# Patient Record
(demographics unavailable — no encounter records)

---

## 2021-07-26 NOTE — NUR
Patient in room 4a laying on gurny c/o right hip pain after mechanical fall. 
Patient is A/Ox3 but mostly farsi speaking. Patient denies head trauma. 
Swelling noted on right hip. Provide comfort and safety measures.

## 2021-07-27 NOTE — NUR
relief afforded with morphine 2 mg iv given.requested to let her rest and sleep, getting 
irritable during assessment and interview process, kept npo .

## 2021-07-27 NOTE — NUR
admitted this 83 y.o. lady from er via [facility],alert,orientedx4, able to make needs known, no 
acute distress noted.admission care done, iv site on left ac patent and no signs of 
infiltration .repositioned for comfort.oral care rendered.skin clear.

## 2021-07-28 NOTE — NUR
Patient awake in bed. On 1L O2 via NC. No signs of acute distress. IV access on left AC #20 
heplock. Call light within reach. Bed alarm on. Will continue to monitor.

## 2021-07-29 NOTE — NUR
LAB AND BLOOD BANK OFFERED TO DRAW TYPE AND SCREEN AGAIN BUT PATIENT REFUSED. SHE AGREED TO 
ALLOW THE DRAW IN AM. TYPE AND SCREEN RESCHEDULE FOR AM.

## 2021-07-29 NOTE — NUR
End of shift note



Patient refuse blood transfusion today. MD Delaney and family agree to repeat CBC tomorrow and 
do transfusion. Patient refuse medication in the morning. Able to take replacement magnesium 
800mg tablet for Mg 1.7. will continue monitor

## 2021-07-29 NOTE — NUR
RECEIVED PATIENT IN BED ALERT AND AWAKE WITH 1 L/M OF O2 VIA NC. CALL LIGHT WITHIN REACH. 
WILL CONTINUE TO MONITOR.

## 2021-07-29 NOTE — NUR
Shift End Report: VS stable. No s/s of respiratory distress. Tolerated O2 @ 1L/min. No 
complaint presented all night. Continue current plan of care. Slept well.

## 2021-07-30 NOTE — NUR
1 UNIT OF BLOOD STARTED. INFUSING TO RIGHT FA #22 GAUGE. VS WNL. AFEBRILE. CALL LIGHT IN 
REACH. ALL NEEDS ATTENDED. WILL CONTINUE TO MONITOR AND ASSESS.

## 2021-07-30 NOTE — NUR
RECEIVED PATIENT AWAKE IN BED. A/O X3. FARSI SPEAKING WITH MINIMAL ENGLISH, BUT ABLE TO MAKE 
NEEDS KNOWN. PATIENT VERBALIZED SHE IS AWARE OF BLOOD TRANSFUSION. CONSENT SIGNED IN CHART. 
VS WNL. PATIENT C/O PAIN IN RIGHT HIP, RADIATING TO LEFT HIP. PATIENT GIVEN NORCO 1 TAB PO 
PRN FOR PAIN. ICE APPLIED FOR ADDITIONAL PAIN DISCOMFORT. DRESSING NOTED TO RIGHT HIP, 
C/D/I. CALL LIGHT IN REACH. ALL NEEDS ATTENDED. WILL CONTINUE TO MONITOR AND ASSESS.

## 2021-07-30 NOTE — NUR
BLOOD TRANSFUSION COMPLETED. VS WNL. NO RESP. DISTRESS NOTED. INFUSED WELL. NO REACTION 
NOTED. WILL CONTINUE TO MONITOR AND ASSESS.

## 2021-07-30 NOTE — NUR
NO TRANSFUSION REACTION NOTED. INFUSING WELL. PATIENT AWAKE IN BED, WATCHING TV. NO SOB 
NOTED OR C/O. WILL CONTINUE TO MONITOR AND ASSESS. CALL LIGHT IN REACH.

## 2021-07-31 NOTE — NUR
PATIENT AWAKE IN BED. C/O ITCHINESS. PATIENT GIVEN BENADRYL 25MG PO PRN. VS WNL. H/L INTACT 
AND PATENT, NOTED TO RIGHT FA #22 GAUGE. PATIENT REPOSITIONED FOR COMFORT AND PRESSURE 
RELIEF. C/O PAIN WHEN MOVED, BUT DENIES ANY NEED FOR PAIN MEDICATION AT THIS TIME. NO RESP. 
DISTRESS NOTED. BED ALARM ON. CALL LIGHT IN REACH. ALL NEEDS ATTENDED. WILL CONTINUE TO 
MONITOR AND ASSESS.

## 2021-07-31 NOTE — NUR
patient in bed awake and alert, HOB elevated. no complains of any SOB, pain or discomfort at 
this time.

parsons catheter draining well. IV flushed, patent and intact. call light and belongings 
within reach. will report to oncoming shift.

## 2021-07-31 NOTE — NUR
PATIENT IN BED SLEEPING IN STABLE CONDITION. NO S/S OF ANY SOB, PAIN OR DISCOMFORT NOTED

AT THIS TIME, RIVERA CATHETER DRAINING WELL. CALL LIGHT AND BELONGINGS WITHIN REACH WILL 
CONTINUE TO MONITOR.

## 2021-08-01 NOTE — NUR
dc orders received noted and carried out,dc instruction and rn report given to the nursing 
home .pt family notified about the dc ,dc heplock per md orders.pt left the facility via 
ambulances in stable condition

## 2021-12-24 NOTE — NUR
RECEIVED PATIENT VIA GURNEY FROM ER. PATIENT IS A/O X3. FARSI SPEAKING BUT ABLE TO MAKE 
NEEDS KNOWN AND SPEAKS MINIMAL ENGLISH. VS WNL. C/O GENERALIZED DISCOMFORT FROM FALL AT HOME 
EARLIER. NO SOB NOTED. H/L INTACT AND PATENT, NOTED TO LEFT AC #20 GAUGE. ORIENTED PATIENT 
TO ROOM AND CALL LIGHT. CALL LIGHT IN REACH. ALL NEEDS ATTENDED. WILL CONTINUE TO MONITOR 
AND ASSESS.

## 2021-12-24 NOTE — NUR
Patient resting in bed. AOx3-4. On room air. No signs of acute distress. Patient complained 
of back pain, Norco PO PRN given and patient expressed relief. Patient refused insertion of 
IV despite explanation of the need of the IV access and risks of refusal and patient still 
refused. Needs anticipated and met. Bed alarm on. Call light within reach. Will endorse to 
incoming shift for continuity of care.

## 2021-12-24 NOTE — NUR
Received patient in bed. AOx3. On RA. No signs of acute distress. Patient denies pain/ 
discomfort. Bed alarm on. Call light within reach. Will continue to monitor.

## 2021-12-24 NOTE — NUR
PATIENT AWAKE IN BED, ASSISTED TO BATHROOM. PATIENT REFUSING FOR IV HEPLOCK. PATIENT 
INFORMED ON IMPORTANCE OF HAVING HEPLOCK, PATIENT STILL REFUSED. RN CHARGE NOTIFIED. WILL 
CONTINUE TO MONITOR AND ASSESS.

## 2021-12-24 NOTE — NUR
Patient is sitting up on edge of bed. She is Farsi speaking. Able to make simple needs 
known, can speak minimal English. C/o pain 8/10 to right leg and constipation. States she 
has not had a BM for 4 days. On RA, No SOB. Patient assisted to the bathroom. Able to 
ambulate using FWW with one person assist. Safety measures initiated. Call light within 
reach.

## 2021-12-25 NOTE — NUR
Pt is a/o x 3, Farsi speaking. Pt complains of right sided leg pain, provided PRN 
medication. Pt has unsteady gait, need assist from bed to wheelchair to use the restroom; 
had 1x BM today. Pt refused morning labs. Pt has a visitor at bedside, call light within 
reach, comfort measures provided, will continue to monitor.

## 2021-12-25 NOTE — NUR
Received patient lying in bed. AAOX4, farsi speaking. Patient denies acute distress. Able to 
ambulate to the bathroom, however, requires assistance. Safety precautions and comfort 
measures initiated. Bed in locked, call light button and frequently used items within reach. 
Will continue to monitor.

## 2021-12-25 NOTE — NUR
Slept intermittently this shift. Continues to have pain to right leg, 8/10 [place] PO provided 
as ordered. Patient expresses pain relief post Norco administration. Able to have one BM 
this shift, states she still feels "full". Noted with anxiety during night, asking staff to 
sleep in her room with her not wanting to be alone. Emotional support and active listening 
provided and effective. On RA, no SOB. On telemetry, NSR. Safety measures continued, call 
light within reach.

## 2021-12-26 NOTE — NUR
Patient slept through the night. All due medications were given as ordered and tolerated 
well. Patient complained of having a mild headache and pain of 8-10 on her lower 
extremities, PRN medications were given and tolerated well.  No acute distress noted at this 
time. Needs were addressed and met. Safety precautions and comfort measures maintained. Will 
endorse to day shift.

## 2021-12-26 NOTE — NUR
Pt is in wheelchair at this time. [id number] of pain or acute distress. Comfort 
measures provided. Pt plan is to discharge, pending approval for rehab unit or placement in 
SNF. pt is compliant with medications and cooperative with care. vitals within normal 
limits, saturating 98% on room air, no temperature. Call light within reach. Will continue 
to monitor pt.

## 2021-12-27 NOTE — NUR
Received to care, initially up in wheel chair,pleasant, requesting her eye drops, and pain 
medication for her back pain. This writer came to her room, at 2100, with her eye drops, and 
PRN Norco, for pain. When she found out the Norco was not due again until 2117, she became 
angry, yelling, and stating, get out of here. I dont want anything. She was encouraged to 
take her eye drops first, and that by the time she was done, it would be time again for the 
Norco. This writer spent several minutes trying to convince her. So long, in fact, that her 
Norco was already due, but she still refused everything. This writer saved the Norco for 
several hours, but she still did not want it, so it was returned to the pharmacy. She ended 
up sleeping fairly well. and continues to sleep, now. No distress, noted.

## 2021-12-27 NOTE — NUR
Fall precaution implemented.  Pt has good appetite for breakfast.  Pt sitting in chair.  
Call light is within reach.

## 2021-12-27 NOTE — NUR
Pt continues to refuse blood draw.  Explained to patient purpose of the test that the dr 
ordered but pt continues to refuse blood draw.  PT tolerated physical therapy.  Pt on 
wheelchair denies any c/o pain.

## 2021-12-27 NOTE — NUR
Spoke with YONAS Ruvalcaba.  Discharge instructions given.  Pt will be transferred to ARU here 
at [facility].  Skin dry and intact.

## 2021-12-28 NOTE — NUR
pt was admitted last night, in the service of ARU, from [facility], rm [id number]. Fernando Blum, and 
YARI Marie were both notified of the admission. Pt was received lying in bed, calm 
and cooperative, and slept well, throughout the night. No distress noted.

## 2021-12-29 NOTE — NUR
PT seen and visited by DR Blum with new orders for Norco 10 /325 BID at 0800 and 1300 
schedule to help for pain before PT ,Celebrex 200 mg daily at 0900 and Lyrica 25 mg Q8  
order noted and carry out.

## 2021-12-29 NOTE — NUR
PATIENT STATED SHE HAS A LOT OF PAIN SO I GAVE HER THE OPTION OF ONE OR TWO NORCOS AND SHE 
WANTED TWO GIVEN AS ORDERED MADE COMFORTABLE WILL CONTINUE TO OBSERVE.

## 2021-12-29 NOTE — NUR
Received AAOx3 resting in bed in bed,. Assisted to the bathroom as needed. PRN Norco was 
given as order, for right hip pain , with good results. Up in W/C ambulating with PT assist 
by FWW. No acute distress noted, all schedule meds given as order

## 2021-12-30 NOTE — NUR
Patient is on Norco during the shift as routine. No distress identified. Kept call light 
within reach. All due meds given. All needs attended. Safety measures maintained. Aspiration 
precaution maintained. Will endorse for continuity of care.

## 2021-12-31 NOTE — NUR
Nursing- OOB to chair to the bathroom, continent of her bladder. Complained of lower back 
pain. as well as right upper arm pain, claimed r/t to previous fall, safety reviewed and 
emphasized.

## 2021-12-31 NOTE — NUR
Nursing- Dr Rhett BARRON, in to see patient , was informed of her itching on her hands, orders 
received.

## 2021-12-31 NOTE — NUR
No distress noted. Kept call light within reach. Frequent visual check. Safety precaution 
maintained. All due meds given. All needs attended. Will endorse to the next shift for 
continuity of care.

## 2022-01-01 NOTE — NUR
Received resident awake on bed with no respiratory distress. She is alert and oriented x4, 
able to make needs known, BRP using walker with assistance. Cozaar PO not given d/t pt 
refusal and /48. Tylenol PRN given for left shoulder pain. All needs attended. Call 
light placed within reach. Will endorse to next shift.

## 2022-01-01 NOTE — NUR
Received resident awake on bed with no respiratory distress upon initial rounds. Slept 
throughout the night, easily arousable for care. She is alert and oriented x4, able to make 
needs known, BRP using walker with assistance. Due meds given on time and tolerated well. 
All needs attended. Call light placed within reach. Frequent visual checks done. Will 
endorse to next shift.

## 2022-01-01 NOTE — NUR
Patient is alert and oriented , pleasant and cooperative upon assessment. Patient in room 
air saturating at 95%. All due meds given per MD order. Patient has no signs of distress. 
Kept skin clean and dry. Placed call light within reach.

## 2022-01-01 NOTE — NUR
Received resident awake on bed with no respiratory distress upon initial rounds. Slept 
throughout the night, easily arousable for care. She is alert and oriented x4, able to make 
needs known, BRP using walker with assistance. Due meds given on time and tolerated well. 
All needs attended. Call light placed within reach. Frequent visual checks done. Will 
endorse to next shift. 

-------------------------------------------------------------------------------

Addendum: 01/01/22 at 2117 by ABRAM SALAZAR RN

-------------------------------------------------------------------------------

Strike out charting, wrong documentation.

## 2022-01-02 NOTE — NUR
Received pt sitting up in wheelchair, assisted pt back to bed. Alert and oriented to name, 
place, time and situation. Able to state all needs. On room air saturating at 99%. No signs 
of acute distress. Belongings and call lights within reach. Safety measures initiated.

## 2022-01-03 NOTE — NUR
Pt slept throughout the night. Alert and oriented to name, place, time and situation. On 
room air saturating at 98%. No signs of acute distress. Tylenol 650 mg PO PRN given for back 
pain. Able to make needs known. Needs have been attended and met. Belongings and call lights 
within reach. Safety measures maintained.

## 2022-01-04 NOTE — NUR
Patient in bed, alert and oriented x 4, cooperative upon assessment, in room air saturating 
at 95%. All needs met promptly. Assisted to the bathroom and placed back to bed with bed 
alarm functioning well. Kept skin clean and dry. Call light within easy reach.

## 2022-01-04 NOTE — NUR
per son, the patient is taking Moxifloxacin 0.5% on her right eye every 4 hours 4x a day. 
Notified Dr. Ron and awaiting for response and endorsed tot he next shift.

## 2022-01-05 NOTE — NUR
Received patient asleep in bed. On room air. No signs of acute distress. Bed alarm on. Call 
light within reach. Will continue to monitor.

## 2022-01-05 NOTE — NUR
Patient resting in bed. AOx3-4. On room air. No signs of acute distress. Patient denies 
pain/ discomfort at this time. Compliant with medications and care. Participated with 
physical and occupational therapy. Bed alarm on. Call light within reach. Will endorse to 
incoming shift for continuity of care.

## 2022-01-06 NOTE — NUR
Patient's son Andrew at bedside and mentioned regarding antibiotic eyedrops 1 drop 4x a day 
on the right eye that needs to be continued together with Predforte eyedrops as advised by 
patient's ophthalmologist s/p corneal transplant 2 months ago. Sent a message to Dr. Ron awaiting for reply. Endorsed accordingly to night shift nurse.

## 2022-01-06 NOTE — NUR
Patient remains alert, oriented x 3, not in any form of distress, on room air. Due 
medications administered as ordered and patient tolerated well. She participated with PT and 
OT during the shift. Assisted with her needs promptly. Call light and frequently used items 
placed within patient's reach.

## 2022-01-06 NOTE — NUR
Patient very needy,rude, verbally abusive and demanding. Very forgetful  and very inpatient. 
Constantly calling and calling demanding her eyedrops, and complaining that she did not have 
her dinner. Medicated twice for pain with relief. Slept afterwards. All needs attended and 
met. No significant event reported all night. Continue care as planned.

## 2022-01-08 NOTE — NUR
Pt slept throughout the night, no respiratory distress noted. Easily arousable for care. Due 
medication given on time and tolerated well. Medicated pain x1 and noted effective. All 
needs attended. Call light placed within reach. Frequent visual checks done.

## 2022-01-08 NOTE — NUR
Patient remain AAO X 4 resting in bed. On room air. No signs of acute distress. Patient 
medicated for  pain/ discomfort as per order. Compliant with medications and care. Up out 
bed  with physical and occupational therapy ambulating with FWW. Bed alarm on. Call light 
within reach. Will endorse to incoming shift for continuity of care.